# Patient Record
Sex: FEMALE | URBAN - METROPOLITAN AREA
[De-identification: names, ages, dates, MRNs, and addresses within clinical notes are randomized per-mention and may not be internally consistent; named-entity substitution may affect disease eponyms.]

---

## 2019-09-03 ENCOUNTER — HISTORICAL (OUTPATIENT)
Dept: CARDIOLOGY | Facility: HOSPITAL | Age: 69
End: 2019-09-03

## 2019-09-30 ENCOUNTER — OFFICE VISIT (OUTPATIENT)
Dept: FAMILY MEDICINE | Facility: CLINIC | Age: 69
End: 2019-09-30
Payer: COMMERCIAL

## 2019-09-30 VITALS
RESPIRATION RATE: 17 BRPM | DIASTOLIC BLOOD PRESSURE: 80 MMHG | TEMPERATURE: 98 F | SYSTOLIC BLOOD PRESSURE: 140 MMHG | HEIGHT: 63 IN | OXYGEN SATURATION: 96 % | BODY MASS INDEX: 32.93 KG/M2 | HEART RATE: 95 BPM | WEIGHT: 185.88 LBS

## 2019-09-30 DIAGNOSIS — M54.6 ACUTE BILATERAL THORACIC BACK PAIN: Primary | ICD-10-CM

## 2019-09-30 PROCEDURE — 1101F PR PT FALLS ASSESS DOC 0-1 FALLS W/OUT INJ PAST YR: ICD-10-PCS | Mod: CPTII,S$GLB,, | Performed by: FAMILY MEDICINE

## 2019-09-30 PROCEDURE — 99203 OFFICE O/P NEW LOW 30 MIN: CPT | Mod: S$GLB,,, | Performed by: FAMILY MEDICINE

## 2019-09-30 PROCEDURE — 1101F PT FALLS ASSESS-DOCD LE1/YR: CPT | Mod: CPTII,S$GLB,, | Performed by: FAMILY MEDICINE

## 2019-09-30 PROCEDURE — 99203 PR OFFICE/OUTPT VISIT, NEW, LEVL III, 30-44 MIN: ICD-10-PCS | Mod: S$GLB,,, | Performed by: FAMILY MEDICINE

## 2019-09-30 RX ORDER — HYDROGEN PEROXIDE 3 %
20 SOLUTION, NON-ORAL MISCELLANEOUS
COMMUNITY

## 2019-09-30 RX ORDER — SIMVASTATIN 20 MG/1
20 TABLET, FILM COATED ORAL DAILY
Refills: 2 | COMMUNITY
Start: 2019-06-25

## 2019-09-30 RX ORDER — ASPIRIN 81 MG/1
81 TABLET ORAL DAILY
COMMUNITY

## 2019-09-30 RX ORDER — FOLIC ACID 1 MG/1
1 TABLET ORAL
COMMUNITY

## 2019-09-30 RX ORDER — SULFASALAZINE 500 MG/1
500 TABLET ORAL
COMMUNITY

## 2019-09-30 RX ORDER — METHOTREXATE 25 MG/ML
INJECTION, SOLUTION INTRA-ARTERIAL; INTRAMUSCULAR; INTRAVENOUS
Refills: 2 | COMMUNITY
Start: 2019-07-08

## 2019-09-30 RX ORDER — MULTIVIT WITH MINERALS/HERBS
1 TABLET ORAL DAILY
COMMUNITY

## 2019-09-30 RX ORDER — LISINOPRIL AND HYDROCHLOROTHIAZIDE 12.5; 2 MG/1; MG/1
TABLET ORAL
COMMUNITY
Start: 2019-09-11

## 2019-09-30 RX ORDER — PREDNISONE 10 MG/1
TABLET ORAL
Refills: 1 | COMMUNITY
Start: 2019-08-21

## 2019-09-30 RX ORDER — SIMVASTATIN 40 MG/1
40 TABLET, FILM COATED ORAL DAILY
Refills: 3 | COMMUNITY
Start: 2019-09-22

## 2019-09-30 RX ORDER — ETODOLAC 400 MG/1
400 TABLET, FILM COATED ORAL
COMMUNITY
End: 2019-11-05 | Stop reason: ALTCHOICE

## 2019-09-30 RX ORDER — AMLODIPINE BESYLATE 10 MG/1
10 TABLET ORAL DAILY
Refills: 3 | COMMUNITY
Start: 2019-09-18

## 2019-09-30 RX ORDER — NAPROXEN 500 MG/1
500 TABLET ORAL 2 TIMES DAILY PRN
Qty: 60 TABLET | Refills: 0 | Status: SHIPPED | OUTPATIENT
Start: 2019-09-30 | End: 2019-11-04 | Stop reason: SDUPTHER

## 2019-09-30 RX ORDER — DICLOFENAC SODIUM 10 MG/G
2 GEL TOPICAL 4 TIMES DAILY PRN
Qty: 100 G | Refills: 0 | Status: SHIPPED | OUTPATIENT
Start: 2019-09-30

## 2019-09-30 NOTE — PROGRESS NOTES
[facility] - Clinic Note    Subjective      Ms. Elias is a 69 y.o. female who presents to clinic for Back Pain    Reports that on Friday night she began having mid back back pain. There is no radiation to the back pain.  There is no numbness or tingling in her lower extremities.  Denies any nausea or vomiting.  Denies any overlying rash.  Denies any dysuria or increased frequency or hematuria.  Has had kidney stones before and this is not feel like that pain. Took Advil on Saturday night which helped but yesterday she took Advil and it did not help.  She was awake all night secondary to the pain.  She has not been able to sleep all day secondary to the pain. The pain is intermittent and comes in waves and last for less than a minute and then it is a dull pain afterwards.    PMH Saba has a past medical history of Hyperlipidemia, Rheumatoid arteritis, and Stroke.   PSXH Saba has no past surgical history on file.    Saba's family history includes Cancer in her father.   SH Saba reports that she has never smoked. She has never used smokeless tobacco. Her alcohol and drug histories are not on file.   ALG Saba has No Known Allergies.   MED Saba has a current medication list which includes the following prescription(s): amlodipine, aspirin, b complex vitamins, calcium carbonate-vitamin d3, esomeprazole, etodolac, folic acid, lisinopril-hydrochlorothiazide, methotrexate, prednisone, simvastatin, simvastatin, sulfasalazine, diclofenac sodium, and naproxen.     Review of Systems   Constitutional: Negative for chills and fever.   HENT: Negative for congestion and rhinorrhea.    Eyes: Negative for visual disturbance.   Respiratory: Negative for cough and shortness of breath.    Cardiovascular: Negative for chest pain.   Gastrointestinal: Negative for abdominal pain, constipation, diarrhea, nausea and vomiting.   Genitourinary: Negative for dysuria and frequency.   Musculoskeletal: Positive for back pain.  "Negative for myalgias.   Skin: Negative for rash.   Neurological: Negative for weakness and headaches.     Objective     BP (!) 140/80 (BP Location: Right arm, Patient Position: Sitting, BP Method: X-Large (Automatic))   Pulse 95   Temp 98.1 °F (36.7 °C) (Oral)   Resp 17   Ht 5' 3" (1.6 m)   Wt 84.3 kg (185 lb 14.4 oz)   SpO2 96%   BMI 32.93 kg/m²     Physical Exam   Constitutional: She appears well-developed and well-nourished. No distress.   Eyes: Right eye exhibits no discharge. Left eye exhibits no discharge.   Cardiovascular: Normal rate, regular rhythm and normal heart sounds.   Pulmonary/Chest: Effort normal and breath sounds normal. She has no wheezes. She has no rales.   Abdominal: Soft. Bowel sounds are normal. There is no tenderness.   Musculoskeletal:        Cervical back: Normal.        Thoracic back: She exhibits pain. She exhibits no tenderness, no bony tenderness and no spasm.        Lumbar back: Normal.   Neurological: She is alert.   Skin: Skin is warm and dry.   Psychiatric: She has a normal mood and affect.   Nursing note and vitals reviewed.     Assessment/Plan     1. Acute bilateral thoracic back pain  diclofenac sodium (VOLTAREN) 1 % Gel    naproxen (NAPROSYN) 500 MG tablet     Unclear etiology of thoracic back pain. Appears to be musculoskeletal in nature.  Will treat with naproxen 500 mg b.i.d. if patient is no longer taking etodolac. Otherwise take etodolac 400 mg bid. Diclofenac gel prn. Tramadol prn breakthrough pain.    Follow up if symptoms worsen or fail to improve.    No future appointments.    Wilfrid Horowitz MD  Family Medicine  [facility]          "

## 2019-11-04 DIAGNOSIS — M54.6 ACUTE BILATERAL THORACIC BACK PAIN: ICD-10-CM

## 2019-11-04 NOTE — TELEPHONE ENCOUNTER
Received fax from [facility] pharmacy for refill of Naproxen. RX pended for 30 day supply 0 refills .

## 2019-11-05 RX ORDER — NAPROXEN 500 MG/1
500 TABLET ORAL 2 TIMES DAILY PRN
Qty: 60 TABLET | Refills: 0 | Status: SHIPPED | OUTPATIENT
Start: 2019-11-05

## 2020-05-01 ENCOUNTER — HISTORICAL (OUTPATIENT)
Dept: CARDIOLOGY | Facility: HOSPITAL | Age: 70
End: 2020-05-01

## 2021-06-09 ENCOUNTER — HISTORICAL (OUTPATIENT)
Dept: CARDIOLOGY | Facility: HOSPITAL | Age: 71
End: 2021-06-09

## 2025-08-28 ENCOUNTER — HOSPITAL ENCOUNTER (OUTPATIENT)
Facility: HOSPITAL | Age: 75
Discharge: HOME | End: 2025-08-28
Attending: INTERNAL MEDICINE | Admitting: INTERNAL MEDICINE
Payer: MEDICARE

## 2025-08-28 VITALS
HEART RATE: 98 BPM | WEIGHT: 180 LBS | DIASTOLIC BLOOD PRESSURE: 76 MMHG | OXYGEN SATURATION: 98 % | BODY MASS INDEX: 31.89 KG/M2 | RESPIRATION RATE: 16 BRPM | SYSTOLIC BLOOD PRESSURE: 132 MMHG | HEIGHT: 63 IN

## 2025-08-28 DIAGNOSIS — Z45.010 ENCOUNTER FOR PACEMAKER AT END OF BATTERY LIFE: ICD-10-CM

## 2025-08-28 PROCEDURE — 25000003 PHARM REV CODE 250: Performed by: INTERNAL MEDICINE

## 2025-08-28 PROCEDURE — 63600175 PHARM REV CODE 636 W HCPCS: Performed by: INTERNAL MEDICINE

## 2025-08-28 PROCEDURE — 33286 RMVL SUBQ CAR RHYTHM MNTR: CPT | Performed by: INTERNAL MEDICINE

## 2025-08-28 RX ORDER — DIAZEPAM 5 MG/1
10 TABLET ORAL
Status: DISCONTINUED | OUTPATIENT
Start: 2025-08-28 | End: 2025-08-28 | Stop reason: HOSPADM

## 2025-08-28 RX ORDER — LIDOCAINE HYDROCHLORIDE 10 MG/ML
INJECTION, SOLUTION INFILTRATION; PERINEURAL
Status: DISCONTINUED | OUTPATIENT
Start: 2025-08-28 | End: 2025-08-28 | Stop reason: HOSPADM

## 2025-08-28 RX ADMIN — DIAZEPAM 10 MG: 5 TABLET ORAL at 02:08

## (undated) DEVICE — BLADE SURG STAINLESS STEEL #15

## (undated) DEVICE — SYR DISP LL 5CC

## (undated) DEVICE — SCALPEL #10 BLADE STRL DISP

## (undated) DEVICE — TOWEL OR DISP STRL BLUE 4/PK

## (undated) DEVICE — GOWN X-LG STERILE BACK

## (undated) DEVICE — NDL BLNT STD 1.5IN 18G

## (undated) DEVICE — APPLICATOR CHLORAPREP CLR 3ML

## (undated) DEVICE — GLOVE 7.5 PROTEXIS PI MICRO

## (undated) DEVICE — DRESSING GAUZE 6PLY 4X4

## (undated) DEVICE — NDL HYPO REG 25G X 1 1/2

## (undated) DEVICE — GLOVE 8.0 PROTEXIS PI MICRO

## (undated) DEVICE — DRAPE UTILITY W/ TAPE 20X30IN

## (undated) DEVICE — ADHESIVE DERMABOND ADVANCED